# Patient Record
Sex: FEMALE
[De-identification: names, ages, dates, MRNs, and addresses within clinical notes are randomized per-mention and may not be internally consistent; named-entity substitution may affect disease eponyms.]

---

## 2018-01-23 PROBLEM — Z00.00 ENCOUNTER FOR PREVENTIVE HEALTH EXAMINATION: Status: ACTIVE | Noted: 2018-01-23

## 2018-02-01 ENCOUNTER — APPOINTMENT (OUTPATIENT)
Dept: ORTHOPEDIC SURGERY | Facility: CLINIC | Age: 53
End: 2018-02-01
Payer: MEDICAID

## 2018-02-01 VITALS
SYSTOLIC BLOOD PRESSURE: 131 MMHG | HEART RATE: 55 BPM | WEIGHT: 230 LBS | BODY MASS INDEX: 39.27 KG/M2 | HEIGHT: 64 IN | DIASTOLIC BLOOD PRESSURE: 80 MMHG

## 2018-02-01 DIAGNOSIS — M17.12 UNILATERAL PRIMARY OSTEOARTHRITIS, LEFT KNEE: ICD-10-CM

## 2018-02-01 PROCEDURE — 20610 DRAIN/INJ JOINT/BURSA W/O US: CPT | Mod: LT

## 2018-02-01 PROCEDURE — 99204 OFFICE O/P NEW MOD 45 MIN: CPT | Mod: 25

## 2018-02-01 PROCEDURE — 73562 X-RAY EXAM OF KNEE 3: CPT | Mod: LT

## 2022-11-02 ENCOUNTER — OFFICE (OUTPATIENT)
Dept: URBAN - METROPOLITAN AREA CLINIC 76 | Facility: CLINIC | Age: 57
Setting detail: OPHTHALMOLOGY
End: 2022-11-02
Payer: COMMERCIAL

## 2022-11-02 DIAGNOSIS — H43.811: ICD-10-CM

## 2022-11-02 DIAGNOSIS — H43.391: ICD-10-CM

## 2022-11-02 PROCEDURE — 92012 INTRM OPH EXAM EST PATIENT: CPT | Performed by: OPHTHALMOLOGY

## 2022-11-02 ASSESSMENT — REFRACTION_MANIFEST
OD_AXIS: 87
OS_SPHERE: +1.75
OS_ADD: +2.25
OD_SPHERE: +1.25
OS_VA1: 20/25
OS_AXIS: 96
OS_CYLINDER: -0.75
OD_ADD: +2.25
OD_CYLINDER: -0.25
OD_VA1: 20/25

## 2022-11-02 ASSESSMENT — AXIALLENGTH_DERIVED
OS_AL: 23.7487
OS_AL: 23.6995
OD_AL: 23.4673
OD_AL: 23.6128

## 2022-11-02 ASSESSMENT — REFRACTION_AUTOREFRACTION
OS_CYLINDER: -1.00
OD_SPHERE: +1.75
OS_SPHERE: +1.75
OD_CYLINDER: -0.50
OS_AXIS: 96
OD_AXIS: 87

## 2022-11-02 ASSESSMENT — CONFRONTATIONAL VISUAL FIELD TEST (CVF)
OS_FINDINGS: FULL
OD_FINDINGS: FULL

## 2022-11-02 ASSESSMENT — REFRACTION_CURRENTRX
OS_SPHERE: +1.75
OD_OVR_VA: 20/
OD_CYLINDER: -0.25
OD_ADD: +2.00
OS_ADD: +2.00
OD_SPHERE: +1.25
OD_AXIS: 96
OS_CYLINDER: -0.75
OS_OVR_VA: 20/
OS_AXIS: 86

## 2022-11-02 ASSESSMENT — KERATOMETRY
OS_K2POWER_DIOPTERS: 41.75
OD_K2POWER_DIOPTERS: 42.75
OS_K1POWER_DIOPTERS: 41.75
OS_AXISANGLE_DEGREES: 90
OD_K1POWER_DIOPTERS: 41.75
OD_AXISANGLE_DEGREES: 82

## 2022-11-02 ASSESSMENT — SPHEQUIV_DERIVED
OD_SPHEQUIV: 1.125
OD_SPHEQUIV: 1.5
OS_SPHEQUIV: 1.375
OS_SPHEQUIV: 1.25

## 2022-11-02 ASSESSMENT — TONOMETRY
OD_IOP_MMHG: 14
OS_IOP_MMHG: 19

## 2022-11-02 ASSESSMENT — VISUAL ACUITY
OS_BCVA: 20/25
OD_BCVA: 20/25

## 2022-11-22 ENCOUNTER — OFFICE (OUTPATIENT)
Dept: URBAN - METROPOLITAN AREA CLINIC 76 | Facility: CLINIC | Age: 57
Setting detail: OPHTHALMOLOGY
End: 2022-11-22
Payer: COMMERCIAL

## 2022-11-22 DIAGNOSIS — H10.45: ICD-10-CM

## 2022-11-22 DIAGNOSIS — H43.811: ICD-10-CM

## 2022-11-22 PROCEDURE — 92014 COMPRE OPH EXAM EST PT 1/>: CPT | Performed by: OPHTHALMOLOGY

## 2022-11-22 ASSESSMENT — AXIALLENGTH_DERIVED
OD_AL: 23.656
OS_AL: 23.5557
OD_AL: 23.7051
OS_AL: 23.6532

## 2022-11-22 ASSESSMENT — REFRACTION_AUTOREFRACTION
OD_CYLINDER: -0.50
OS_AXIS: 89
OD_AXIS: 95
OS_CYLINDER: -0.75
OD_SPHERE: +1.50
OS_SPHERE: +2.00

## 2022-11-22 ASSESSMENT — REFRACTION_MANIFEST
OD_ADD: +2.25
OS_CYLINDER: -0.75
OD_CYLINDER: -0.25
OS_ADD: +2.25
OD_VA1: 20/25
OS_VA1: 20/25
OS_SPHERE: +1.75
OD_SPHERE: +1.25
OS_AXIS: 96
OD_AXIS: 87

## 2022-11-22 ASSESSMENT — REFRACTION_CURRENTRX
OS_SPHERE: +1.75
OD_CYLINDER: -0.25
OD_SPHERE: +1.25
OS_ADD: +2.00
OD_ADD: +2.00
OS_OVR_VA: 20/
OD_AXIS: 96
OS_AXIS: 86
OS_CYLINDER: -0.75
OD_OVR_VA: 20/

## 2022-11-22 ASSESSMENT — SPHEQUIV_DERIVED
OD_SPHEQUIV: 1.25
OS_SPHEQUIV: 1.625
OD_SPHEQUIV: 1.125
OS_SPHEQUIV: 1.375

## 2022-11-22 ASSESSMENT — VISUAL ACUITY
OS_BCVA: 20/25
OD_BCVA: 20/20

## 2022-11-22 ASSESSMENT — TONOMETRY
OS_IOP_MMHG: 14
OD_IOP_MMHG: 17

## 2022-11-22 ASSESSMENT — KERATOMETRY
OD_K1POWER_DIOPTERS: 41.50
OD_K2POWER_DIOPTERS: 42.50
OD_AXISANGLE_DEGREES: 84
OS_AXISANGLE_DEGREES: 86
OS_K2POWER_DIOPTERS: 42.00
OS_K1POWER_DIOPTERS: 41.75

## 2022-11-22 ASSESSMENT — CONFRONTATIONAL VISUAL FIELD TEST (CVF)
OD_FINDINGS: FULL
OS_FINDINGS: FULL

## 2024-03-05 ENCOUNTER — TRANSCRIPTION ENCOUNTER (OUTPATIENT)
Age: 59
End: 2024-03-05

## 2024-03-05 ENCOUNTER — APPOINTMENT (OUTPATIENT)
Dept: ORTHOPEDIC SURGERY | Facility: CLINIC | Age: 59
End: 2024-03-05
Payer: MEDICAID

## 2024-03-05 VITALS
HEART RATE: 64 BPM | BODY MASS INDEX: 43.32 KG/M2 | WEIGHT: 260 LBS | DIASTOLIC BLOOD PRESSURE: 65 MMHG | OXYGEN SATURATION: 95 % | SYSTOLIC BLOOD PRESSURE: 106 MMHG | HEIGHT: 65 IN

## 2024-03-05 DIAGNOSIS — Z72.3 LACK OF PHYSICAL EXERCISE: ICD-10-CM

## 2024-03-05 DIAGNOSIS — M47.816 SPONDYLOSIS W/OUT MYELOPATHY OR RADICULOPATHY, LUMBAR REGION: ICD-10-CM

## 2024-03-05 DIAGNOSIS — M17.10 UNILATERAL PRIMARY OSTEOARTHRITIS, UNSPECIFIED KNEE: ICD-10-CM

## 2024-03-05 DIAGNOSIS — Z87.39 PERSONAL HISTORY OF OTHER DISEASES OF THE MUSCULOSKELETAL SYSTEM AND CONNECTIVE TISSUE: ICD-10-CM

## 2024-03-05 DIAGNOSIS — M25.552 PAIN IN RIGHT HIP: ICD-10-CM

## 2024-03-05 DIAGNOSIS — Z56.0 UNEMPLOYMENT, UNSPECIFIED: ICD-10-CM

## 2024-03-05 DIAGNOSIS — Z82.61 FAMILY HISTORY OF ARTHRITIS: ICD-10-CM

## 2024-03-05 DIAGNOSIS — M25.551 PAIN IN RIGHT HIP: ICD-10-CM

## 2024-03-05 DIAGNOSIS — Z86.79 PERSONAL HISTORY OF OTHER DISEASES OF THE CIRCULATORY SYSTEM: ICD-10-CM

## 2024-03-05 PROCEDURE — 72100 X-RAY EXAM L-S SPINE 2/3 VWS: CPT

## 2024-03-05 PROCEDURE — 99204 OFFICE O/P NEW MOD 45 MIN: CPT | Mod: 25

## 2024-03-05 PROCEDURE — 20611 DRAIN/INJ JOINT/BURSA W/US: CPT | Mod: RT

## 2024-03-05 PROCEDURE — 73564 X-RAY EXAM KNEE 4 OR MORE: CPT | Mod: 50

## 2024-03-05 PROCEDURE — 73521 X-RAY EXAM HIPS BI 2 VIEWS: CPT

## 2024-03-05 RX ORDER — HYALURONATE SODIUM 20 MG/2 ML
20 SYRINGE (ML) INTRAARTICULAR
Qty: 2 | Refills: 0 | Status: ACTIVE | COMMUNITY
Start: 2024-03-05

## 2024-03-05 RX ORDER — LEVOTHYROXINE SODIUM 137 UG/1
TABLET ORAL
Refills: 0 | Status: ACTIVE | COMMUNITY

## 2024-03-05 RX ORDER — LISINOPRIL 30 MG/1
TABLET ORAL
Refills: 0 | Status: ACTIVE | COMMUNITY

## 2024-03-05 RX ORDER — AMLODIPINE BESYLATE 5 MG/1
TABLET ORAL
Refills: 0 | Status: ACTIVE | COMMUNITY

## 2024-03-05 SDOH — ECONOMIC STABILITY - INCOME SECURITY: UNEMPLOYMENT, UNSPECIFIED: Z56.0

## 2024-03-05 NOTE — ASSESSMENT
[FreeTextEntry1] : MARGARET ROTHMAN is a 58 year old female with low back, bilateral R>L hip and knee pain. I discussed with the patient that their symptoms, signs, and imaging are most consistent with lumbar spondylosis, greater trochanteric pain syndrome and osteoarthritis. We reviewed the natural history of this condition and treatment options ranging from conservative measures (activity modification, physical therapy, icing, oral anti-inflammatory and/or analgesic medications, steroid injection, HA gel injections, PRP injections) to surgical management. We agreed on the following plan:   XR taken and reviewed with patient today. US guided aspiration and CSI as detailed above. Activity modification: low impact aerobic activity (stationary bike, elliptical, swimming) Recommend 150 min per week of moderate intensity aerobic activity Start Home Exercises for knee and spine conditioning. Demonstration and handout provided. Physical therapy. Referral provided. Medication: Meloxicam prescription provided. Will place order for HA gel injection. Patient will be notified by team regarding [facility] program. Patient will be notified once authorized to schedule appointment

## 2024-03-05 NOTE — HISTORY OF PRESENT ILLNESS
[de-identified] : MARGARET ROTHMAN is a 58 year old female  who presents with bilateral knee R>L pain. States the onset of pain was several years ago. 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 States that it has also been present for several years.  Had left knee CSI with Dr. Mac in 2018 Pain is anteromedial knee, lateral hips and across low lumbar There is associated right knee swelling, stiffness, There is no associated numbness, paraesthesia, weakness, bladder or bowel dysfunction.  Exacerbating factors are standing, walking for prolonged periods, climbing and descending stairs, rising from seated position. Has tried Ibuprofen.  Patient ambulates independently. Exercise: not regularly  Has PT coming to her home.

## 2024-03-05 NOTE — DISCUSSION/SUMMARY

## 2024-03-05 NOTE — PHYSICAL EXAM
[de-identified] : General: Obese, alert, and in no acute distress. Head: Normocephalic. Eyes: Pupils equal, extraocular muscles intact, normal sclera. Nose: No nasal discharge. Cardiovascular: Extremities are warm and well perfused. Distal pulses are symmetric bilaterally. Respiratory: No labored breathing. Extremities: Sensation is intact distally bilaterally. Distal pulses are symmetric bilaterally Lymphatic: No regional lymphadenopathy, no lymphedema Neurologic: No focal deficits Skin: Normal skin color, texture, and turgor Psychiatric: Normal affect  MSK: Examination of the Lumbar Spine: Gait slow shuffling Ambulating with cane Unable to toe walk, heel walk No pain with forward flexion, extension, lateral flexion R, L, rotation Tender to palpation: midline, paraspinals, SI jt, GTB, piriformis, gluteals   Lumbar Facet Loading [negative]   Log roll negative Straight leg raise negative Tight hamstrings FRANCISCO negative b/l FADIR negative b/l   Examination of [right] knee:   Genu [valgum] alignment Moderate effusion No erythema, hematoma or skin lesion Tender to palpation: medial joint line, lateral joint line Nontender to palpation:  medial patellar facet, lateral patellar facet, quad tendon, patellar tendon, pes, Gerdy's tubercle, tibial tuberosity, popliteal fossa, hamstrings, ITB No warmth No Baker's cyst palpable ROM: 0-[100] Mild patellar crepitus   Log roll negative Lachman negative Anterior drawer negative Posterior drawer negative Varus/valgus stress negative at 0 and 30 deg Shaneka negative Extensor mechanism intact     Examination of [left] knee:   No effusion, erythema, hematoma or skin lesion Tender to palpation: medial joint line Nontender to palpation: medial joint line, lateral joint line, medial patellar facet, lateral patellar facet, quad tendon, patellar tendon, pes, Gerdy's tubercle, tibial tuberosity, popliteal fossa, hamstrings, ITB No warmth No Baker's cyst palpable ROM: 0-100 [No] patellar crepitus   Log roll negative Lachman negative Anterior drawer negative Posterior drawer negative Varus/valgus stress negative at 0 and 30 deg Shaneka negative Extensor mechanism intact   Sensation is intact to light touch over the superficial and deep peroneal nerve distributions and the posterior tibial nerve distribution. Capillary refill is less than two seconds. Posterior tibial and dorsalis pedis pulses 2+ equal bilaterally. No calf swelling or tenderness bilaterally. Strength testing shows hip flexion 5/5, hip adduction 5/5, hip abduction 5/5, knee extension 5/5, knee flexion 5/5, dorsiflexion 5/5, plantar flexion 5/5, EHL 5/5 Reflexes: Patellar 2+, Achilles 2+.  [de-identified] : XR L spine L[facility] (3/5/24): There is no evidence of fracture or dislocation. There is normal alignment. Loss of intervertebral disc space at L4-L5 with anterior osteophytosis. Multilevel facet arthrosis.  XR bilateral hip L[facility]V (3/5/24): There is no evidence of fracture or dislocation. The joint spaces are preserved.  XR bilateral knee L[facility]V (3/5/24): There is no evidence of fracture or dislocation. Moderate lateral and patellofemoral joint space narrowing, subchondral sclerosis and osteophytosis of left knee. Right knee lateral joint space narrowing and patellar osteophytosis. Moderate lateral patellar displacement of right knee.

## 2024-03-05 NOTE — PROCEDURE
[de-identified] : Ultrasound guided intra-articular steroid injection of right knee:  Following a discussion of the risks (bleeding, infection) and benefits, verbal consent was obtained. Patient placed in supine position. The suprapatellar recess and surrounding structures (patella, femur, quadriceps tendon, suprapatellar fat pad and prefemoral fat pad) were visualized in SAX and LAX with Sonosite 15 Hz linear transducer.   Superolateral knee was anaesthetised with ethyl chloride spray. Under strict sterile technique the right knee was prepped with chlorhexadine. Using ultrasound guidance (superolateral approach) a 20 G 1.5 inch needle was inserted into the suprapatellar recess and after aspiration of 22 mL of clear, serous fluid, 1mL Triamcinolone, 4mL 0.5% Bupivacaine and 2mL 1% lidocaine was injected intra-articularly.    The use of direct ultrasound visualization was necessary to increase patient safety by identifying and avoiding inadvertent needle placement within the neurovascular and osteochondral structures. Additionally, the increased accuracy of needle placement may improve therapeutic efficacy and allow higher diagnostic specificity when evaluating the effectiveness of this injection.  The patient tolerated the procedure well. Post-injection instructions given (no strenuous activity for 48 hours, ice, elevate). Patient verbalized understanding.

## 2024-05-14 ENCOUNTER — APPOINTMENT (OUTPATIENT)
Dept: ORTHOPEDIC SURGERY | Facility: CLINIC | Age: 59
End: 2024-05-14
Payer: MEDICAID

## 2024-05-14 VITALS
SYSTOLIC BLOOD PRESSURE: 133 MMHG | HEART RATE: 60 BPM | HEIGHT: 65 IN | BODY MASS INDEX: 43.32 KG/M2 | WEIGHT: 260 LBS | DIASTOLIC BLOOD PRESSURE: 86 MMHG | OXYGEN SATURATION: 95 %

## 2024-05-14 PROCEDURE — 20611 DRAIN/INJ JOINT/BURSA W/US: CPT | Mod: 50

## 2024-05-14 RX ORDER — MELOXICAM 7.5 MG/1
7.5 TABLET ORAL
Qty: 30 | Refills: 1 | Status: ACTIVE | COMMUNITY
Start: 2024-04-12 | End: 1900-01-01

## 2024-05-14 NOTE — PROCEDURE
[de-identified] : MARGARET ROTHMAN is a 59 year old female with bilateral knee OA who presents for viscosupplementation injection of bilateral knees. Pain was worsened by lifting her mother during her final days. Her mother  last month. No recent infection, fever or skin lesions.   Ultrasound-guided intra-articular viscosupplementation injection of right knee:   Following a discussion of the risks (bleeding, infection) and benefits, verbal consent was obtained. Patient placed in supine position. The suprapatellar recess and surrounding structures (patella, femur, quadriceps tendon, suprapatellar fat pad and prefemoral fat pad) were visualized in SAX and LAX with Sonosite 15 Hz linear transducer.   Superolateral knee was anaesthetised with ethyl chloride spray. Under strict sterile technique the right knee was prepped with chlorhexadine. Using ultrasound guidance (superolateral approach) a 20 G 1.5 inch needle was inserted into the suprapatellar recess and after aspiration of 24 mL of clear, yellow serous fluid,  2mL of Euflexxa HA gel was injected intra-articularly without resistance.   The use of direct ultrasound visualization was necessary to increase patient safety by identifying and avoiding inadvertent needle placement within the neurovascular and osteochondral structures. Additionally, the increased accuracy of needle placement may improve therapeutic efficacy and allow higher diagnostic specificity when evaluating the effectiveness of this injection.    Ultrasound-guided intra-articular viscosupplementation injection of left knee:    The suprapatellar recess and surrounding structures (patella, femur, quadriceps tendon, suprapatellar fat pad and prefemoral fat pad) were visualized in SAX and LAX with Sonosite 15 Hz linear transducer.   Superolateral knee was anaesthetised with ethyl chloride spray. Under strict sterile technique the right knee was prepped with chlorhexadine. Using ultrasound guidance (superolateral approach) a 20 G 1.5 inch needle was inserted into the suprapatellar recess and after apiration of 6 mL of serosanguinous fluid,  2 mL of Euflexxa HA gel was injected intra-articularly without resistance.   The use of direct ultrasound visualization was necessary to increase patient safety by identifying and avoiding inadvertent needle placement within the neurovascular and osteochondral structures. Additionally, the increased accuracy of needle placement may improve therapeutic efficacy and allow higher diagnostic specificity when evaluating the effectiveness of this injection.   The patient tolerated the procedures well. Post-injection instructions given (no strenuous activity for 48 hours, ice, elevate). Patient verbalized understanding. Start Physical Therapy (has been having in home therapy and would now like to try outside location). Referral provided. Meloxicam prn refill prescription provided. Follow up in 1 week for #2/3 HA injection.  EUFLEXXA BILATERAL KNEE JOINT   LOT:   [id number] EXP:  2025 MAN: [employer] NDC: [address]-6

## 2024-05-21 ENCOUNTER — APPOINTMENT (OUTPATIENT)
Dept: ORTHOPEDIC SURGERY | Facility: CLINIC | Age: 59
End: 2024-05-21
Payer: MEDICAID

## 2024-05-21 VITALS
BODY MASS INDEX: 43.32 KG/M2 | HEART RATE: 61 BPM | SYSTOLIC BLOOD PRESSURE: 123 MMHG | DIASTOLIC BLOOD PRESSURE: 84 MMHG | HEIGHT: 65 IN | OXYGEN SATURATION: 95 % | WEIGHT: 260 LBS

## 2024-05-21 PROCEDURE — 20611 DRAIN/INJ JOINT/BURSA W/US: CPT | Mod: 50

## 2024-05-21 NOTE — PROCEDURE
[de-identified] : MARGARET ROTHMAN is a 59 year old female with bilateral knee OA who presents for viscosupplementation injection of bilateral knees. No recent infection, fever or skin lesions.    Ultrasound-guided intra-articular viscosupplementation injection of right knee:   Following a discussion of the risks (bleeding, infection) and benefits, verbal consent was obtained. Patient placed in supine position. The suprapatellar recess and surrounding structures (patella, femur, quadriceps tendon, suprapatellar fat pad and prefemoral fat pad) were visualized in SAX and LAX with Sonosite 15 Hz linear transducer.   Superolateral knee was anaesthetised with ethyl chloride spray. Under strict sterile technique the right knee was prepped with chlorhexadine. Using ultrasound guidance (superolateral approach) a 20 G 1.5 inch needle was inserted into the suprapatellar recess and 2 mL of Euflexxa HA gel was injected intra-articularly without resistance.   The use of direct ultrasound visualization was necessary to increase patient safety by identifying and avoiding inadvertent needle placement within the neurovascular and osteochondral structures. Additionally, the increased accuracy of needle placement may improve therapeutic efficacy and allow higher diagnostic specificity when evaluating the effectiveness of this injection.    Ultrasound-guided intra-articular viscosupplementation injection of left knee:    The suprapatellar recess and surrounding structures (patella, femur, quadriceps tendon, suprapatellar fat pad and prefemoral fat pad) were visualized in SAX and LAX with Sonosite 15 Hz linear transducer.   Superolateral knee was anaesthetised with ethyl chloride spray. Under strict sterile technique the right knee was prepped with chlorhexadine. Using ultrasound guidance (superolateral approach) a 20 G 1.5 inch needle was inserted into the suprapatellar recess and 2 mL of Euflexxa HA gel was injected intra-articularly without resistance.   The use of direct ultrasound visualization was necessary to increase patient safety by identifying and avoiding inadvertent needle placement within the neurovascular and osteochondral structures. Additionally, the increased accuracy of needle placement may improve therapeutic efficacy and allow higher diagnostic specificity when evaluating the effectiveness of this injection.   The patient tolerated the procedures well. Post-injection instructions given (no strenuous activity for 48 hours, ice, elevate). Patient verbalized understanding. c/w Meloxicam prn Add acetaminophen 1g TID prn. Follow up in 1 week for #3/3 HA gel injections.  EUFLEXXA BILATERAL KNEE JOINT  LOT: [id number] EXP: 2025-04-26 MAN: [employer] NDC: [address]-7.

## 2024-05-27 ENCOUNTER — RX RENEWAL (OUTPATIENT)
Age: 59
End: 2024-05-27

## 2024-05-28 ENCOUNTER — APPOINTMENT (OUTPATIENT)
Dept: ORTHOPEDIC SURGERY | Facility: CLINIC | Age: 59
End: 2024-05-28
Payer: MEDICAID

## 2024-05-28 VITALS
HEIGHT: 65 IN | HEART RATE: 57 BPM | SYSTOLIC BLOOD PRESSURE: 125 MMHG | WEIGHT: 260 LBS | OXYGEN SATURATION: 95 % | BODY MASS INDEX: 43.32 KG/M2 | DIASTOLIC BLOOD PRESSURE: 83 MMHG

## 2024-05-28 DIAGNOSIS — M17.0 BILATERAL PRIMARY OSTEOARTHRITIS OF KNEE: ICD-10-CM

## 2024-05-28 PROCEDURE — 20611 DRAIN/INJ JOINT/BURSA W/US: CPT | Mod: 50

## 2024-05-29 PROBLEM — M17.0 PRIMARY OSTEOARTHRITIS OF BOTH KNEES: Status: ACTIVE | Noted: 2024-03-05

## 2024-05-29 NOTE — PROCEDURE
[de-identified] : MARGARET ROTHMAN is a 59 year old female with bilateral knee OA who presents for viscosupplementation injection of bilateral knees. No recent infection, fever or skin lesions.  Ultrasound-guided intra-articular viscosupplementation injection of right knee:  Following a discussion of the risks (bleeding, infection) and benefits, verbal consent was obtained. Patient placed in supine position. The suprapatellar recess and surrounding structures (patella, femur, quadriceps tendon, suprapatellar fat pad and prefemoral fat pad) were visualized in SAX and LAX with Sonosite 15 Hz linear transducer.  Superolateral knee was anaesthetised with ethyl chloride spray. Under strict sterile technique the right knee was prepped with chlorhexadine. Using ultrasound guidance (superolateral approach) a 20 G 1.5 inch needle was inserted into the suprapatellar recess and 2 mL of Euflexxa HA gel was injected intra-articularly without resistance.  The use of direct ultrasound visualization was necessary to increase patient safety by identifying and avoiding inadvertent needle placement within the neurovascular and osteochondral structures. Additionally, the increased accuracy of needle placement may improve therapeutic efficacy and allow higher diagnostic specificity when evaluating the effectiveness of this injection.   Ultrasound-guided intra-articular viscosupplementation injection of left knee:   The suprapatellar recess and surrounding structures (patella, femur, quadriceps tendon, suprapatellar fat pad and prefemoral fat pad) were visualized in SAX and LAX with Sonosite 15 Hz linear transducer.  Superolateral knee was anaesthetised with ethyl chloride spray. Under strict sterile technique the right knee was prepped with chlorhexadine. Using ultrasound guidance (superolateral approach) a 20 G 1.5 inch needle was inserted into the suprapatellar recess and 2 mL of Euflexxa HA gel was injected intra-articularly without resistance.  The use of direct ultrasound visualization was necessary to increase patient safety by identifying and avoiding inadvertent needle placement within the neurovascular and osteochondral structures. Additionally, the increased accuracy of needle placement may improve therapeutic efficacy and allow higher diagnostic specificity when evaluating the effectiveness of this injection.  The patient tolerated the procedures well. Post-injection instructions given (no strenuous activity for 48 hours, ice, elevate). Patient verbalized understanding. c/w Meloxicam prn and acetaminophen 1g TID prn. Follow up in 3 months.  EUFLEXXA BILATERAL KNEE JOINT  LOT:  [id number] EXP: 2025-03-08 MAN: [employer] NDC: [id number].

## 2024-06-24 ENCOUNTER — RX RENEWAL (OUTPATIENT)
Age: 59
End: 2024-06-24

## 2024-06-24 RX ORDER — MELOXICAM 5 MG/1
5 CAPSULE ORAL
Qty: 60 | Refills: 0 | Status: ACTIVE | COMMUNITY
Start: 2024-05-27 | End: 1900-01-01

## 2024-07-13 ENCOUNTER — RX RENEWAL (OUTPATIENT)
Age: 59
End: 2024-07-13

## 2024-08-12 ENCOUNTER — RX RENEWAL (OUTPATIENT)
Age: 59
End: 2024-08-12

## 2024-09-02 ENCOUNTER — RX RENEWAL (OUTPATIENT)
Age: 59
End: 2024-09-02

## 2024-09-20 ENCOUNTER — RX RENEWAL (OUTPATIENT)
Age: 59
End: 2024-09-20

## 2024-10-31 ENCOUNTER — RX RENEWAL (OUTPATIENT)
Age: 59
End: 2024-10-31

## 2024-12-28 ENCOUNTER — RX RENEWAL (OUTPATIENT)
Age: 59
End: 2024-12-28

## 2025-03-27 ENCOUNTER — RX RENEWAL (OUTPATIENT)
Age: 60
End: 2025-03-27

## 2025-05-20 ENCOUNTER — RX RENEWAL (OUTPATIENT)
Age: 60
End: 2025-05-20

## 2025-06-27 ENCOUNTER — RX RENEWAL (OUTPATIENT)
Age: 60
End: 2025-06-27

## 2025-08-04 ENCOUNTER — RX RENEWAL (OUTPATIENT)
Age: 60
End: 2025-08-04